# Patient Record
Sex: MALE | Race: WHITE | Employment: OTHER | URBAN - METROPOLITAN AREA
[De-identification: names, ages, dates, MRNs, and addresses within clinical notes are randomized per-mention and may not be internally consistent; named-entity substitution may affect disease eponyms.]

---

## 2017-07-29 ENCOUNTER — HOSPITAL ENCOUNTER (EMERGENCY)
Facility: HOSPITAL | Age: 42
Discharge: HOME | End: 2017-07-29
Attending: EMERGENCY MEDICINE
Payer: MEDICAID

## 2017-07-29 VITALS
BODY MASS INDEX: 28.7 KG/M2 | RESPIRATION RATE: 20 BRPM | HEART RATE: 78 BPM | TEMPERATURE: 98 F | DIASTOLIC BLOOD PRESSURE: 76 MMHG | HEIGHT: 71 IN | WEIGHT: 205 LBS | SYSTOLIC BLOOD PRESSURE: 124 MMHG | OXYGEN SATURATION: 97 %

## 2017-07-29 DIAGNOSIS — M10.9 ACUTE GOUT OF FOOT, UNSPECIFIED CAUSE, UNSPECIFIED LATERALITY: Primary | ICD-10-CM

## 2017-07-29 PROCEDURE — 99283 EMERGENCY DEPT VISIT LOW MDM: CPT

## 2017-07-29 RX ORDER — INDOMETHACIN 50 MG/1
50 CAPSULE ORAL 3 TIMES DAILY
Qty: 15 CAPSULE | Refills: 0 | Status: SHIPPED | OUTPATIENT
Start: 2017-07-29 | End: 2017-08-04

## 2017-07-29 NOTE — ED PROVIDER NOTES
Patient Seen in: [facility] AND [facility] Emergency Department    History   Patient presents with:  Swelling Edema (cardiovascular, metabolic)    Stated Complaint:     HPI    25-year-old male with history of gout presents for evaluation of bilateral foot pain. None (Room air)    Current:/76   Pulse 78   Temp 97.8 °F (36.6 °C) (Tympanic)   Resp 20   Ht 180.3 cm (5' 11\")   Wt 93 kg   SpO2 97%   BMI 28.59 kg/m²         Physical Exam   Constitutional: He is oriented to person, place, and time.  He appears well specified. Medications Prescribed:  Current Discharge Medication List    START taking these medications    !! indomethacin 50 MG Oral Cap  Take 1 capsule (50 mg total) by mouth 3 (three) times daily.  Take 3 times daily until pain is tolerable  Qty: 15 c

## 2017-08-04 ENCOUNTER — LAB ENCOUNTER (OUTPATIENT)
Dept: LAB | Age: 42
End: 2017-08-04
Attending: FAMILY MEDICINE
Payer: MEDICAID

## 2017-08-04 ENCOUNTER — OFFICE VISIT (OUTPATIENT)
Dept: FAMILY MEDICINE CLINIC | Facility: CLINIC | Age: 42
End: 2017-08-04

## 2017-08-04 VITALS
HEART RATE: 90 BPM | DIASTOLIC BLOOD PRESSURE: 78 MMHG | WEIGHT: 194 LBS | BODY MASS INDEX: 27 KG/M2 | SYSTOLIC BLOOD PRESSURE: 130 MMHG

## 2017-08-04 DIAGNOSIS — Z00.00 PHYSICAL EXAM: ICD-10-CM

## 2017-08-04 DIAGNOSIS — Z00.00 PHYSICAL EXAM: Primary | ICD-10-CM

## 2017-08-04 DIAGNOSIS — M10.00 ACUTE IDIOPATHIC GOUT, UNSPECIFIED SITE: ICD-10-CM

## 2017-08-04 LAB
ALBUMIN SERPL BCP-MCNC: 3.9 G/DL (ref 3.5–4.8)
ALBUMIN/GLOB SERPL: 1.1 {RATIO} (ref 1–2)
ALP SERPL-CCNC: 76 U/L (ref 32–100)
ALT SERPL-CCNC: 25 U/L (ref 17–63)
ANION GAP SERPL CALC-SCNC: 9 MMOL/L (ref 0–18)
AST SERPL-CCNC: 20 U/L (ref 15–41)
BASOPHILS # BLD: 0 K/UL (ref 0–0.2)
BASOPHILS NFR BLD: 1 %
BILIRUB SERPL-MCNC: 0.5 MG/DL (ref 0.3–1.2)
BUN SERPL-MCNC: 8 MG/DL (ref 8–20)
BUN/CREAT SERPL: 10.1 (ref 10–20)
CALCIUM SERPL-MCNC: 9.2 MG/DL (ref 8.5–10.5)
CHLORIDE SERPL-SCNC: 102 MMOL/L (ref 95–110)
CHOLEST SERPL-MCNC: 134 MG/DL (ref 110–200)
CO2 SERPL-SCNC: 27 MMOL/L (ref 22–32)
CREAT SERPL-MCNC: 0.79 MG/DL (ref 0.5–1.5)
EOSINOPHIL # BLD: 0.1 K/UL (ref 0–0.7)
EOSINOPHIL NFR BLD: 1 %
ERYTHROCYTE [DISTWIDTH] IN BLOOD BY AUTOMATED COUNT: 12.6 % (ref 11–15)
GLOBULIN PLAS-MCNC: 3.5 G/DL (ref 2.5–3.7)
GLUCOSE SERPL-MCNC: 96 MG/DL (ref 70–99)
HCT VFR BLD AUTO: 39.6 % (ref 41–52)
HDLC SERPL-MCNC: 28 MG/DL
HGB BLD-MCNC: 13.5 G/DL (ref 13.5–17.5)
LDLC SERPL CALC-MCNC: 71 MG/DL (ref 0–99)
LYMPHOCYTES # BLD: 1.4 K/UL (ref 1–4)
LYMPHOCYTES NFR BLD: 16 %
MCH RBC QN AUTO: 30.7 PG (ref 27–32)
MCHC RBC AUTO-ENTMCNC: 34.1 G/DL (ref 32–37)
MCV RBC AUTO: 90.1 FL (ref 80–100)
MONOCYTES # BLD: 0.7 K/UL (ref 0–1)
MONOCYTES NFR BLD: 9 %
NEUTROPHILS # BLD AUTO: 6.1 K/UL (ref 1.8–7.7)
NEUTROPHILS NFR BLD: 74 %
NONHDLC SERPL-MCNC: 106 MG/DL
OSMOLALITY UR CALC.SUM OF ELEC: 284 MOSM/KG (ref 275–295)
PLATELET # BLD AUTO: 304 K/UL (ref 140–400)
PMV BLD AUTO: 8.8 FL (ref 7.4–10.3)
POTASSIUM SERPL-SCNC: 3.8 MMOL/L (ref 3.3–5.1)
PROT SERPL-MCNC: 7.4 G/DL (ref 5.9–8.4)
RBC # BLD AUTO: 4.4 M/UL (ref 4.5–5.9)
SODIUM SERPL-SCNC: 138 MMOL/L (ref 136–144)
TRIGL SERPL-MCNC: 173 MG/DL (ref 1–149)
URATE SERPL-MCNC: 7.4 MG/DL (ref 3.3–8.7)
WBC # BLD AUTO: 8.4 K/UL (ref 4–11)

## 2017-08-04 PROCEDURE — 84550 ASSAY OF BLOOD/URIC ACID: CPT

## 2017-08-04 PROCEDURE — 99212 OFFICE O/P EST SF 10 MIN: CPT | Performed by: FAMILY MEDICINE

## 2017-08-04 PROCEDURE — 80053 COMPREHEN METABOLIC PANEL: CPT

## 2017-08-04 PROCEDURE — 36415 COLL VENOUS BLD VENIPUNCTURE: CPT

## 2017-08-04 PROCEDURE — 85025 COMPLETE CBC W/AUTO DIFF WBC: CPT

## 2017-08-04 PROCEDURE — 99203 OFFICE O/P NEW LOW 30 MIN: CPT | Performed by: FAMILY MEDICINE

## 2017-08-04 PROCEDURE — 80061 LIPID PANEL: CPT

## 2017-08-04 RX ORDER — INDOMETHACIN 50 MG/1
50 CAPSULE ORAL 3 TIMES DAILY
Qty: 30 CAPSULE | Refills: 1 | Status: SHIPPED | OUTPATIENT
Start: 2017-08-04 | End: 2017-08-04

## 2017-08-04 RX ORDER — INDOMETHACIN 50 MG/1
50 CAPSULE ORAL 3 TIMES DAILY
Qty: 50 CAPSULE | Refills: 1 | Status: SHIPPED | OUTPATIENT
Start: 2017-08-04 | End: 2019-01-31

## 2017-08-07 NOTE — PROGRESS NOTES
HPI:    Patient ID: Wanda Zavala is a 38year old male. Here for f/u ER. Was there for gout . Not sure if has gout or pseudogout. Has this several times a year. Multiple joints were involved so far. Now much better. But needs refill on indocin.  No Platelet [E]      Uric Acid, Serum    Meds This Visit:  Signed Prescriptions Disp Refills    indomethacin 50 MG Oral Cap 50 capsule 1      Sig: Take 1 capsule (50 mg total) by mouth 3 (three) times daily.  Take 3 times daily until pain is tolerable

## 2017-08-10 ENCOUNTER — APPOINTMENT (OUTPATIENT)
Dept: LAB | Age: 42
End: 2017-08-10
Attending: INTERNAL MEDICINE
Payer: MEDICAID

## 2017-08-10 ENCOUNTER — OFFICE VISIT (OUTPATIENT)
Dept: RHEUMATOLOGY | Facility: CLINIC | Age: 42
End: 2017-08-10

## 2017-08-10 VITALS
HEART RATE: 89 BPM | HEIGHT: 71 IN | BODY MASS INDEX: 27.75 KG/M2 | SYSTOLIC BLOOD PRESSURE: 123 MMHG | DIASTOLIC BLOOD PRESSURE: 74 MMHG | WEIGHT: 198.19 LBS

## 2017-08-10 DIAGNOSIS — M13.0: ICD-10-CM

## 2017-08-10 DIAGNOSIS — M1A.0710 CHRONIC GOUT OF RIGHT FOOT, UNSPECIFIED CAUSE: ICD-10-CM

## 2017-08-10 DIAGNOSIS — M13.0: Primary | ICD-10-CM

## 2017-08-10 LAB
CRP SERPL-MCNC: 1.1 MG/DL (ref 0–0.9)
ERYTHROCYTE [SEDIMENTATION RATE] IN BLOOD: 9 MM/HR (ref 0–15)
RHEUMATOID FACT SER QL: <5 IU/ML
URATE SERPL-MCNC: 9.2 MG/DL (ref 3.3–8.7)

## 2017-08-10 PROCEDURE — 86140 C-REACTIVE PROTEIN: CPT

## 2017-08-10 PROCEDURE — 86200 CCP ANTIBODY: CPT

## 2017-08-10 PROCEDURE — 86038 ANTINUCLEAR ANTIBODIES: CPT

## 2017-08-10 PROCEDURE — 36415 COLL VENOUS BLD VENIPUNCTURE: CPT

## 2017-08-10 PROCEDURE — 84550 ASSAY OF BLOOD/URIC ACID: CPT

## 2017-08-10 PROCEDURE — 99244 OFF/OP CNSLTJ NEW/EST MOD 40: CPT | Performed by: INTERNAL MEDICINE

## 2017-08-10 PROCEDURE — 85652 RBC SED RATE AUTOMATED: CPT

## 2017-08-10 PROCEDURE — 99212 OFFICE O/P EST SF 10 MIN: CPT | Performed by: INTERNAL MEDICINE

## 2017-08-10 PROCEDURE — 86431 RHEUMATOID FACTOR QUANT: CPT

## 2017-08-10 RX ORDER — ALLOPURINOL 100 MG/1
100 TABLET ORAL DAILY
Qty: 30 TABLET | Refills: 6 | Status: SHIPPED | OUTPATIENT
Start: 2017-08-10 | End: 2018-04-17

## 2017-08-10 NOTE — PATIENT INSTRUCTIONS
1. Check labs  2. Start allopurinol 100mg a day   3. Take indocine 50mg a day x 2 weeks when starting alloprouinol   4. Change diet - decrease red meat intake   5. Return to clinic in 2-3 months.    6. Call and can give you prednisone burst for any flare up

## 2017-08-10 NOTE — PROGRESS NOTES
Luisa Langford is a 38year old male who presents for Patient presents with: Foot Pain: right foot pain  . HPI:     I had the pleasure of seeing Luisa Langford on 8/10/2017 for evaluation. Referred by dr. Cyndi Pedersen.      He is a pleasant 38year old who has gout Medical History:   Diagnosis Date   • Gout    • Gout       History reviewed. No pertinent surgical history.    Family History   Problem Relation Age of Onset   • Diabetes Father    • pseudogout [OTHER] Father       Social History:  Smoking status: Never Smo Mid tarsal area slight raised area /swelling -   Left knee not tender, nos welling , left ankel and toes not tender  EXTREMITIES: no cyanosis, clubbing or edema  NEURO: intact touch, 5/5 ue and le strength    Component      Latest Ref Rng & Units 8/4/201 1 - 149 mg/dL 173 (H)  536 (H)   HDL Cholesterol      mg/dL 28  30 (L)   VLDL Cholesterol Jose Miguel      5 - 40 mg/dL      LDL Cholesterol Calc      0 - 99 mg/dL 71     LDL/HDL RATIO      ratio units   CANCELED   NON HDL CHOL      <130 mg/dL 106     TSH      0.4

## 2017-08-11 LAB — CCP IGG SERPL-ACNC: 1.3 U/ML (ref 0–6.9)

## 2017-08-15 LAB — ANA SER QL: NEGATIVE

## 2017-08-19 ENCOUNTER — TELEPHONE (OUTPATIENT)
Dept: RHEUMATOLOGY | Facility: CLINIC | Age: 42
End: 2017-08-19

## 2017-08-19 RX ORDER — PREDNISONE 10 MG/1
TABLET ORAL
Qty: 21 TABLET | Refills: 0 | Status: SHIPPED | OUTPATIENT
Start: 2017-08-19 | End: 2020-03-09 | Stop reason: ALTCHOICE

## 2017-08-19 NOTE — TELEPHONE ENCOUNTER
Patient reports pain in his toes and hands 8/10 on the pain scale. Its worse at night. Since he started Allopurinol Monday he has noticed an increase in pain. He would like something for the pain. Please advise.

## 2017-08-19 NOTE — TELEPHONE ENCOUNTER
Pt calling and stts he is in so much pain. Pts stts he is having a flare up worse then the one from two weeks ago. Pt stts is most likely from the med he was given (allopurinol 100 MG Oral Tab).  Pt stts he has a busy weekend and needs some type of pain med

## 2017-08-28 ENCOUNTER — TELEPHONE (OUTPATIENT)
Dept: RHEUMATOLOGY | Facility: CLINIC | Age: 42
End: 2017-08-28

## 2017-08-28 RX ORDER — PREDNISONE 1 MG/1
TABLET ORAL
Qty: 42 TABLET | Refills: 0 | Status: SHIPPED | OUTPATIENT
Start: 2017-08-28 | End: 2020-03-09 | Stop reason: ALTCHOICE

## 2017-08-28 NOTE — TELEPHONE ENCOUNTER
Left message and direction for prednisone burst starting at 6 tablet to 1 tablet and then to continue on 1 tablet daily. Follow up if pain continues. Call office back with questions.

## 2017-08-28 NOTE — TELEPHONE ENCOUNTER
Please see below. He reports finishing prednisone burst last Thursday or Friday. He flare up started this am in right foot and the palm of right hand. Taking allopurinol 100 mg daily as prescribed. Pt advised he can take indomethacin for his pain.  Please a

## 2017-08-28 NOTE — TELEPHONE ENCOUNTER
Pt calling and stts he is having another gout flare up. Pt stts the med below is not working. Please advise. Medication Quantity Refills Start End   allopurinol 100 MG Oral Tab 30 tablet 6 8/10/2017    Sig :  Take 1 tablet (100 mg total) by mouth daily.

## 2017-08-28 NOTE — TELEPHONE ENCOUNTER
Sent in prednisone burst - 5mg tablets - he can take burst and then stay on 5mg aday for 1 month - have him return to clinic if he still has pain

## 2018-04-17 ENCOUNTER — TELEPHONE (OUTPATIENT)
Dept: RHEUMATOLOGY | Facility: CLINIC | Age: 43
End: 2018-04-17

## 2018-04-17 NOTE — TELEPHONE ENCOUNTER
Pharmacy called and states they just received a fax for     allopurinol 100 MG Oral Tab  Pharmacist states they just received the fax back that they sent to Dr   They did not receive a rx

## 2018-04-18 RX ORDER — ALLOPURINOL 100 MG/1
100 TABLET ORAL DAILY
Qty: 30 TABLET | Refills: 3 | Status: SHIPPED | OUTPATIENT
Start: 2018-04-18 | End: 2018-08-17

## 2018-08-17 RX ORDER — ALLOPURINOL 100 MG/1
100 TABLET ORAL DAILY
Qty: 30 TABLET | Refills: 1 | Status: SHIPPED | OUTPATIENT
Start: 2018-08-17 | End: 2020-03-09

## 2018-08-17 NOTE — TELEPHONE ENCOUNTER
LOV: 8/10/17  No future appointments.   Labs:   Component      Latest Ref Rng & Units 8/10/2017   C-REACTIVE PROTEIN      0.0 - 0.9 mg/dL 1.1 (H)   SED RATE      0 - 15 mm/Hr 9   TAMICA SCREEN      Negative Negative   C-Citrullinated Peptide IgG AB      0.0 -

## 2018-12-17 ENCOUNTER — TELEPHONE (OUTPATIENT)
Dept: RHEUMATOLOGY | Facility: CLINIC | Age: 43
End: 2018-12-17

## 2018-12-17 RX ORDER — PREDNISONE 10 MG/1
TABLET ORAL
Qty: 21 TABLET | Refills: 0 | Status: SHIPPED | OUTPATIENT
Start: 2018-12-17 | End: 2020-03-09 | Stop reason: ALTCHOICE

## 2018-12-17 NOTE — TELEPHONE ENCOUNTER
Called and spoke with pt, c/o gout flare up in big toe on both feet, pain 6/10 in toes which extends up to calf ms, and tender to touch pt reports. Pt is asking for refill request or same day appt. Will forward to provider for further pt instructions.

## 2018-12-17 NOTE — TELEPHONE ENCOUNTER
Patient is having a bad Gout Flare and requesting either refill for     allopurinol 100 MG Oral Tab Take 1 tablet (100 mg total) by mouth daily. Disp: 30 tablet Rfl: 1     Or something to calm the flare or an appointment ASAP.      First opening is 01/08/20

## 2018-12-18 NOTE — TELEPHONE ENCOUNTER
Left message for the pt, but Can you let pt.  Know that I sent in a prednisone burst and that he needs to make a follow up asap - please schedule him for first available - and put him on the wait list -

## 2018-12-18 NOTE — TELEPHONE ENCOUNTER
Called and spoke with pt, informed him of prescription at pharmacy and pt states he is headed there now to . Advised to schedule f/u, pt is on vacation in beginning to mid January. Scheduled 1/31/19 with Dr. Julia Caballero.  Pt in agreement and verbal

## 2019-01-31 ENCOUNTER — TELEPHONE (OUTPATIENT)
Dept: PULMONOLOGY | Facility: CLINIC | Age: 44
End: 2019-01-31

## 2019-01-31 ENCOUNTER — APPOINTMENT (OUTPATIENT)
Dept: LAB | Age: 44
End: 2019-01-31
Attending: INTERNAL MEDICINE
Payer: MEDICAID

## 2019-01-31 ENCOUNTER — OFFICE VISIT (OUTPATIENT)
Dept: RHEUMATOLOGY | Facility: CLINIC | Age: 44
End: 2019-01-31
Payer: MEDICAID

## 2019-01-31 VITALS
HEIGHT: 71 IN | BODY MASS INDEX: 29.4 KG/M2 | SYSTOLIC BLOOD PRESSURE: 130 MMHG | WEIGHT: 210 LBS | DIASTOLIC BLOOD PRESSURE: 79 MMHG | HEART RATE: 93 BPM

## 2019-01-31 DIAGNOSIS — M1A.0710 CHRONIC GOUT OF RIGHT FOOT, UNSPECIFIED CAUSE: Primary | ICD-10-CM

## 2019-01-31 DIAGNOSIS — M1A.0710 CHRONIC GOUT OF RIGHT FOOT, UNSPECIFIED CAUSE: ICD-10-CM

## 2019-01-31 LAB
ALBUMIN SERPL BCP-MCNC: 4 G/DL (ref 3.5–4.8)
ALBUMIN/GLOB SERPL: 1.3 {RATIO} (ref 1–2)
ALP SERPL-CCNC: 59 U/L (ref 32–100)
ALT SERPL-CCNC: 28 U/L (ref 17–63)
ANION GAP SERPL CALC-SCNC: 11 MMOL/L (ref 0–18)
AST SERPL-CCNC: 23 U/L (ref 15–41)
BILIRUB SERPL-MCNC: 0.5 MG/DL (ref 0.3–1.2)
BUN SERPL-MCNC: 9 MG/DL (ref 8–20)
BUN/CREAT SERPL: 8.3 (ref 10–20)
CALCIUM SERPL-MCNC: 9.1 MG/DL (ref 8.5–10.5)
CHLORIDE SERPL-SCNC: 103 MMOL/L (ref 95–110)
CO2 SERPL-SCNC: 24 MMOL/L (ref 22–32)
CREAT SERPL-MCNC: 1.08 MG/DL (ref 0.5–1.5)
GLOBULIN PLAS-MCNC: 3.2 G/DL (ref 2.5–3.7)
GLUCOSE SERPL-MCNC: 77 MG/DL (ref 70–99)
OSMOLALITY UR CALC.SUM OF ELEC: 283 MOSM/KG (ref 275–295)
PATIENT FASTING: NO
POTASSIUM SERPL-SCNC: 3.9 MMOL/L (ref 3.3–5.1)
PROT SERPL-MCNC: 7.2 G/DL (ref 5.9–8.4)
SODIUM SERPL-SCNC: 138 MMOL/L (ref 136–144)
URATE SERPL-MCNC: 9.1 MG/DL (ref 3.3–8.7)

## 2019-01-31 PROCEDURE — 99212 OFFICE O/P EST SF 10 MIN: CPT | Performed by: INTERNAL MEDICINE

## 2019-01-31 PROCEDURE — 36415 COLL VENOUS BLD VENIPUNCTURE: CPT

## 2019-01-31 PROCEDURE — 99214 OFFICE O/P EST MOD 30 MIN: CPT | Performed by: INTERNAL MEDICINE

## 2019-01-31 PROCEDURE — 84550 ASSAY OF BLOOD/URIC ACID: CPT

## 2019-01-31 PROCEDURE — 80053 COMPREHEN METABOLIC PANEL: CPT

## 2019-01-31 RX ORDER — INDOMETHACIN 50 MG/1
50 CAPSULE ORAL 3 TIMES DAILY
Qty: 90 CAPSULE | Refills: 1 | Status: SHIPPED | OUTPATIENT
Start: 2019-01-31 | End: 2019-12-26

## 2019-01-31 NOTE — PATIENT INSTRUCTIONS
1. Cont. indocine 50mg three times day as needed until flare is better. 2. Check labs today   3. Return to clinic in 6-12 months.

## 2019-01-31 NOTE — TELEPHONE ENCOUNTER
Fax received at [facility]. PA needed. Plan does not cover this medication. Please call plan at [phone] to initiate PA or call/fax pharmacy to change medication. Patient ID is# [id number].       Current Outpatient Medications:  indomethacin 50 MG Oral C

## 2019-01-31 NOTE — PROGRESS NOTES
Chelsea Pearce is a 37year old male who presents for Patient presents with:  Hand Pain: leg cramps  . HPI:     I had the pleasure of seeing Chelsea Pearce on 8/10/2017 for evaluation. Referred by dr. Severo Fruit.      He is a pleasant 38year old who has gout flare knees.   His diet has not changed. He doesn't drink now for 3 weeks. He now just drinks to twice a week on the weekends. Doesn't drink beer. Burst pack makes him crazy.      Wt Readings from Last 2 Encounters:  01/31/19 : 210 lb (95.3 kg)  08/10/17 pain  Eyes: No visual changes,   CVS: No chest pain, no heart disease  RS: No SOB, no Cough, No Pleurtic pain,   GI: No nausea, no vomiiting, no abominal pain, no hx of ulcer, no gastritis, no heartburn, no dyshpagia, no BRBPR or melena  : no dysuria, at BUN/CREA RATIO      10.0 - 20.0 10.1     CALCULATED OSMOLALITY      275 - 295 mOsm/kg 284     GFR, Non-      >=60 >60     GFR, -American      >=60 >60     WBC      4.0 - 11.0 K/UL 8.4     RBC      4.50 - 5.90 M/UL 4.40 (L)     He likely representing     triceps insertional tendinopathy. ASSESSMENT AND PLAN:   Estephania Sandhu is a 37year old male who presents for Patient presents with:  Hand Pain: leg cramps      1. Gout - clnically pt.  Seems to have longstanding hx of gout -   Like

## 2019-02-01 ENCOUNTER — TELEPHONE (OUTPATIENT)
Dept: RHEUMATOLOGY | Facility: CLINIC | Age: 44
End: 2019-02-01

## 2019-02-04 NOTE — TELEPHONE ENCOUNTER
PA approved from 1/31/2019 through 1/31/2020 Case #[id number]. Pharmacy contracted and aware. Pharmacy will contact pt when script is ready.

## 2019-12-26 RX ORDER — INDOMETHACIN 50 MG/1
50 CAPSULE ORAL 3 TIMES DAILY
Qty: 90 CAPSULE | Refills: 1 | Status: SHIPPED | OUTPATIENT
Start: 2019-12-26 | End: 2021-02-03

## 2019-12-26 NOTE — TELEPHONE ENCOUNTER
Last filled: 1/31/2019 #90 with 1 refill    LOV: 1/31/2019 - pt was scheduled for f/u appt with Dr. Osman Roy in March   Future Appointments   Date Time Provider Blake Latham   3/9/2020  3:30 PM Remington Osorio MD [facility]   Labs:     Component

## 2019-12-26 NOTE — TELEPHONE ENCOUNTER
Fax received in [facility] requesting refill. Current Outpatient Medications   Medication Sig Dispense Refill   • indomethacin 50 MG Oral Cap Take 1 capsule (50 mg total) by mouth 3 (three) times daily.  Take 3 times daily until pain is tolerable 90 capsule 1

## 2020-03-09 ENCOUNTER — OFFICE VISIT (OUTPATIENT)
Dept: RHEUMATOLOGY | Facility: CLINIC | Age: 45
End: 2020-03-09
Payer: MEDICAID

## 2020-03-09 ENCOUNTER — APPOINTMENT (OUTPATIENT)
Dept: LAB | Facility: HOSPITAL | Age: 45
End: 2020-03-09
Attending: INTERNAL MEDICINE
Payer: MEDICAID

## 2020-03-09 VITALS
SYSTOLIC BLOOD PRESSURE: 146 MMHG | RESPIRATION RATE: 16 BRPM | HEIGHT: 71 IN | WEIGHT: 213 LBS | HEART RATE: 76 BPM | DIASTOLIC BLOOD PRESSURE: 79 MMHG | BODY MASS INDEX: 29.82 KG/M2

## 2020-03-09 DIAGNOSIS — M1A.09X0 CHRONIC GOUT OF MULTIPLE SITES, UNSPECIFIED CAUSE: Primary | ICD-10-CM

## 2020-03-09 DIAGNOSIS — M1A.09X0 CHRONIC GOUT OF MULTIPLE SITES, UNSPECIFIED CAUSE: ICD-10-CM

## 2020-03-09 LAB
ALBUMIN SERPL-MCNC: 4.2 G/DL (ref 3.4–5)
ALBUMIN/GLOB SERPL: 1.2 {RATIO} (ref 1–2)
ALP LIVER SERPL-CCNC: 65 U/L (ref 45–117)
ALT SERPL-CCNC: 39 U/L (ref 16–61)
ANION GAP SERPL CALC-SCNC: 7 MMOL/L (ref 0–18)
AST SERPL-CCNC: 21 U/L (ref 15–37)
BILIRUB SERPL-MCNC: 0.3 MG/DL (ref 0.1–2)
BUN BLD-MCNC: 11 MG/DL (ref 7–18)
BUN/CREAT SERPL: 12.5 (ref 10–20)
CALCIUM BLD-MCNC: 9.2 MG/DL (ref 8.5–10.1)
CHLORIDE SERPL-SCNC: 103 MMOL/L (ref 98–112)
CO2 SERPL-SCNC: 28 MMOL/L (ref 21–32)
CREAT BLD-MCNC: 0.88 MG/DL (ref 0.7–1.3)
GLOBULIN PLAS-MCNC: 3.4 G/DL (ref 2.8–4.4)
GLUCOSE BLD-MCNC: 89 MG/DL (ref 70–99)
M PROTEIN MFR SERPL ELPH: 7.6 G/DL (ref 6.4–8.2)
OSMOLALITY SERPL CALC.SUM OF ELEC: 285 MOSM/KG (ref 275–295)
PATIENT FASTING Y/N/NP: NO
POTASSIUM SERPL-SCNC: 3.7 MMOL/L (ref 3.5–5.1)
SODIUM SERPL-SCNC: 138 MMOL/L (ref 136–145)
URATE SERPL-MCNC: 9.4 MG/DL (ref 3.5–7.2)

## 2020-03-09 PROCEDURE — 84550 ASSAY OF BLOOD/URIC ACID: CPT

## 2020-03-09 PROCEDURE — 80053 COMPREHEN METABOLIC PANEL: CPT

## 2020-03-09 PROCEDURE — 99214 OFFICE O/P EST MOD 30 MIN: CPT | Performed by: INTERNAL MEDICINE

## 2020-03-09 PROCEDURE — 36415 COLL VENOUS BLD VENIPUNCTURE: CPT

## 2020-03-09 RX ORDER — ALLOPURINOL 100 MG/1
100 TABLET ORAL DAILY
Qty: 90 TABLET | Refills: 1 | Status: SHIPPED | OUTPATIENT
Start: 2020-03-09 | End: 2020-09-08

## 2020-03-09 NOTE — PATIENT INSTRUCTIONS
1. Cont. indocine 50mg a day tid prn as needed. 2. Check labs today   3. Overlap allopurinol 100mg a day and take indocin 50mg a day x 2 weeks, then ok to stop indocin -   4. Return to clinic in 6-12 months.

## 2020-03-09 NOTE — PROGRESS NOTES
Kalen Woody is a 53year old male who presents for Patient presents with:  Gout  Medication Follow-Up  . HPI:     I had the pleasure of seeing Kalen Woody on 8/10/2017 for evaluation. Referred by dr. Naomie Kim.      He is a pleasant 38year old who has gout and knees. His diet has not changed. He doesn't drink now for 3 weeks. He now just drinks to twice a week on the weekends. Doesn't drink beer. Burst pack makes him crazy. 3/9/2020  His left foot had a flare in 12/2019.  He had a bad flare - an neck pain, no jaw pain, no temple pain  Eyes: No visual changes,   CVS: No chest pain, no heart disease  RS: No SOB, no Cough, No Pleurtic pain,   GI: No nausea, no vomiiting, no abominal pain, no hx of ulcer, no gastritis, no heartburn, no dyshpagia, no B TOTAL PROTEIN      5.9 - 8.4 g/dL 7.4     Albumin      3.5 - 4.8 g/dL 3.9     GLOBULIN, TOTAL      2.5 - 3.7 g/dL 3.5     A/G RATIO      1.0 - 2.0 1.1     ANION GAP      0 - 18 mmol/L 9     BUN/CREA RATIO      10.0 - 20.0 10.1     CALCULATED OSMOLALITY Latest Ref Rng & Units 1/31/2019   Glucose      70 - 99 mg/dL 77   Sodium      136 - 144 mmol/L 138   Potassium      3.3 - 5.1 mmol/L 3.9   Chloride      95 - 110 mmol/L 103   Carbon Dioxide, Total      22 - 32 mmol/L 24   BUN      8 - 20 mg/dL 9   CREA colchicine 0.6mg a day -   - in the past responded to steroid bursts -     Summary:  1. Cont. indocine 50mg a day tid prn as needed. 2. Check labs today   3.  Overlap allopurinol 100mg a day and take indocin 50mg a day x 2 -4 weeks, then ok to stop indoci

## 2020-04-21 ENCOUNTER — PATIENT MESSAGE (OUTPATIENT)
Dept: RHEUMATOLOGY | Facility: CLINIC | Age: 45
End: 2020-04-21

## 2020-04-21 RX ORDER — PREDNISONE 1 MG/1
TABLET ORAL
Qty: 42 TABLET | Refills: 0 | Status: SHIPPED | OUTPATIENT
Start: 2020-04-21

## 2020-04-21 NOTE — TELEPHONE ENCOUNTER
From: Tomi Green  To: Erika Fletcher MD  Sent: 4/21/2020 12:11 PM CDT  Subject: Non-Urgent Medical Question    Had a bad gout flare as I am about 5 weeks in to taking Allopurinol.  Does this sound normal?

## 2020-04-21 NOTE — TELEPHONE ENCOUNTER
Myrtle Urena. Hopefully you got my telephone message. You are having an acute gout attack at the base of your great toe. I sent a 12-day prednisone prescription to [address] [place] [address], at [address] [place] [address].   This should take care of your acu

## 2020-09-08 RX ORDER — ALLOPURINOL 100 MG/1
100 TABLET ORAL DAILY
Qty: 90 TABLET | Refills: 1 | Status: SHIPPED | OUTPATIENT
Start: 2020-09-08

## 2020-09-08 NOTE — TELEPHONE ENCOUNTER
Current Outpatient Medications   Medication Sig Dispense Refill   • allopurinol 100 MG Oral Tab Take 1 tablet (100 mg total) by mouth daily.  90 tablet 1

## 2020-09-08 NOTE — TELEPHONE ENCOUNTER
LOV: 3/9/2020  No future appointments.   Labs:   Component      Latest Ref Rng & Units 3/9/2020   Glucose      70 - 99 mg/dL 89   Sodium      136 - 145 mmol/L 138   Potassium      3.5 - 5.1 mmol/L 3.7   Chloride      98 - 112 mmol/L 103   Carbon Dioxide, To

## 2021-02-03 DIAGNOSIS — M1A.9XX0 CHRONIC GOUT WITHOUT TOPHUS, UNSPECIFIED CAUSE, UNSPECIFIED SITE: Primary | ICD-10-CM

## 2021-02-03 RX ORDER — INDOMETHACIN 50 MG/1
50 CAPSULE ORAL 3 TIMES DAILY PRN
Qty: 90 CAPSULE | Refills: 0 | Status: SHIPPED | OUTPATIENT
Start: 2021-02-03

## 2021-02-03 NOTE — TELEPHONE ENCOUNTER
Last filled: 12/26/19 #90 cap with 1 refill  LOV:  3/9/20  No future appointments. Labs: 3/9/20  Summary:  1. Cont. indocine 50mg a day tid prn as needed. 2. Check labs today   3.  Overlap allopurinol 100mg a day and take indocin 50mg a day x 2 -4 weeks,

## 2021-03-08 ENCOUNTER — TELEPHONE (OUTPATIENT)
Dept: RHEUMATOLOGY | Facility: CLINIC | Age: 46
End: 2021-03-08

## 2021-03-08 DIAGNOSIS — M1A.9XX0 CHRONIC GOUT WITHOUT TOPHUS, UNSPECIFIED CAUSE, UNSPECIFIED SITE: Primary | ICD-10-CM

## 2021-03-08 RX ORDER — ALLOPURINOL 100 MG/1
100 TABLET ORAL DAILY
Qty: 90 TABLET | Refills: 1 | OUTPATIENT
Start: 2021-03-08

## 2021-03-08 NOTE — TELEPHONE ENCOUNTER
LOV: 3/9/20  Last Refilled:#90, 1rf  9/8/20  Labs:URIC ACID  9.4  3/9/20    No future appointments. Please advise.

## 2021-03-08 NOTE — TELEPHONE ENCOUNTER
•  allopurinol 100 MG Oral Tab, Take 1 tablet (100 mg total) by mouth daily. , Disp: 90 tablet, Rfl: 1

## 2021-03-09 NOTE — TELEPHONE ENCOUNTER
LOV: 8/10/17  No future appointments.   Labs:   Component      Latest Ref Rng & Units 8/10/2017   C-REACTIVE PROTEIN      0.0 - 0.9 mg/dL 1.1 (H)   SED RATE      0 - 15 mm/Hr 9   TAMICA SCREEN      Negative Negative   C-Citrullinated Peptide IgG AB      0.0 - well

## 2021-07-27 ENCOUNTER — HOSPITAL ENCOUNTER (OUTPATIENT)
Age: 46
Discharge: HOME | End: 2021-07-27
Attending: EMERGENCY MEDICINE
Payer: MEDICAID

## 2021-07-27 VITALS
OXYGEN SATURATION: 100 % | TEMPERATURE: 98 F | HEART RATE: 88 BPM | DIASTOLIC BLOOD PRESSURE: 88 MMHG | RESPIRATION RATE: 16 BRPM | SYSTOLIC BLOOD PRESSURE: 128 MMHG

## 2021-07-27 DIAGNOSIS — H60.502 ACUTE OTITIS EXTERNA OF LEFT EAR, UNSPECIFIED TYPE: Primary | ICD-10-CM

## 2021-07-27 PROCEDURE — 99213 OFFICE O/P EST LOW 20 MIN: CPT

## 2021-07-27 PROCEDURE — 99203 OFFICE O/P NEW LOW 30 MIN: CPT

## 2021-07-27 RX ORDER — AMOXICILLIN 875 MG/1
875 TABLET, COATED ORAL 2 TIMES DAILY
Qty: 14 TABLET | Refills: 0 | Status: SHIPPED | OUTPATIENT
Start: 2021-07-27 | End: 2021-08-03

## 2021-07-27 RX ORDER — NEOMYCIN SULFATE, POLYMYXIN B SULFATE AND HYDROCORTISONE 10; 3.5; 1 MG/ML; MG/ML; [USP'U]/ML
3 SUSPENSION/ DROPS AURICULAR (OTIC) 4 TIMES DAILY
Qty: 10 ML | Refills: 0 | Status: SHIPPED | OUTPATIENT
Start: 2021-07-27

## 2021-07-27 NOTE — ED PROVIDER NOTES
Patient Seen in: [facility]      History   Patient presents with:  Ear Pain    Stated Complaint: ear pain    HPI/Subjective:   HPI    The patient is a 70-year-old male with a past history of gout who presents now with left ear pain.   The deborah out of 5 and symmetric in the upper and lower extremities bilaterally  Extremities: No focal swelling or tenderness  Skin: No pallor, no redness or warmth to the touch      ED Course   Labs Reviewed - No data to display       Pulse ox is 100% on room air,

## 2022-06-17 DIAGNOSIS — M1A.9XX0 CHRONIC GOUT WITHOUT TOPHUS, UNSPECIFIED CAUSE, UNSPECIFIED SITE: Primary | ICD-10-CM

## 2022-06-17 RX ORDER — INDOMETHACIN 50 MG/1
CAPSULE ORAL
Qty: 90 CAPSULE | Refills: 0 | OUTPATIENT
Start: 2022-06-17

## 2023-03-25 ENCOUNTER — HOSPITAL ENCOUNTER (OUTPATIENT)
Age: 48
Discharge: HOME | End: 2023-03-25
Payer: MEDICAID

## 2023-03-25 VITALS
TEMPERATURE: 98 F | DIASTOLIC BLOOD PRESSURE: 90 MMHG | HEART RATE: 80 BPM | OXYGEN SATURATION: 95 % | RESPIRATION RATE: 16 BRPM | SYSTOLIC BLOOD PRESSURE: 127 MMHG

## 2023-03-25 DIAGNOSIS — J01.90 ACUTE NON-RECURRENT SINUSITIS, UNSPECIFIED LOCATION: Primary | ICD-10-CM

## 2023-03-25 PROCEDURE — 99213 OFFICE O/P EST LOW 20 MIN: CPT

## 2023-03-25 RX ORDER — AMOXICILLIN AND CLAVULANATE POTASSIUM 875; 125 MG/1; MG/1
1 TABLET, FILM COATED ORAL 2 TIMES DAILY
Qty: 20 TABLET | Refills: 0 | Status: SHIPPED | OUTPATIENT
Start: 2023-03-25 | End: 2023-04-04

## 2023-03-25 NOTE — DISCHARGE INSTRUCTIONS
Please take the antibiotics as prescribed for sinusitis. Please also use Flonase and Mucinex for nasal congestion. May also use over the counter decongestants. You can also try using a [place] pot/saline rinses for congestion. Use Tylenol or Motrin for pain or fever. If you develop any shortness of breath, chest pain, severe headache, fever that does not resolve with medications or any other worsening or concerning symptoms you should go to the emergency department. Otherwise follow-up with your primary care doctor.

## 2023-06-13 ENCOUNTER — HOSPITAL ENCOUNTER (OUTPATIENT)
Age: 48
Discharge: HOME | End: 2023-06-13
Payer: MEDICAID

## 2023-06-13 VITALS
TEMPERATURE: 98 F | DIASTOLIC BLOOD PRESSURE: 93 MMHG | OXYGEN SATURATION: 97 % | HEART RATE: 79 BPM | RESPIRATION RATE: 14 BRPM | SYSTOLIC BLOOD PRESSURE: 148 MMHG

## 2023-06-13 DIAGNOSIS — M10.9 ACUTE GOUT OF RIGHT WRIST, UNSPECIFIED CAUSE: Primary | ICD-10-CM

## 2023-06-13 PROCEDURE — 99214 OFFICE O/P EST MOD 30 MIN: CPT

## 2023-06-13 PROCEDURE — 99213 OFFICE O/P EST LOW 20 MIN: CPT

## 2023-06-13 RX ORDER — METHYLPREDNISOLONE 4 MG/1
TABLET ORAL
Qty: 1 EACH | Refills: 0 | Status: SHIPPED | OUTPATIENT
Start: 2023-06-13

## 2023-06-13 RX ORDER — TRAMADOL HYDROCHLORIDE 50 MG/1
TABLET ORAL EVERY 6 HOURS PRN
Qty: 10 TABLET | Refills: 0 | Status: SHIPPED | OUTPATIENT
Start: 2023-06-13 | End: 2023-06-18

## 2023-06-13 RX ORDER — IBUPROFEN 600 MG/1
600 TABLET ORAL EVERY 8 HOURS PRN
Qty: 30 TABLET | Refills: 0 | Status: SHIPPED | OUTPATIENT
Start: 2023-06-13 | End: 2023-06-20

## 2023-06-13 NOTE — DISCHARGE INSTRUCTIONS
Please take medication as prescribed. Close follow-up with your primary care provider is recommended.

## 2023-06-13 NOTE — ED INITIAL ASSESSMENT (HPI)
Presents to IC for Rt hand pain . states woke up in AM w/ severe pain and swelling to Rt hand hx of gout

## 2023-06-22 ENCOUNTER — OFFICE VISIT (OUTPATIENT)
Dept: FAMILY MEDICINE CLINIC | Facility: CLINIC | Age: 48
End: 2023-06-22

## 2023-06-22 VITALS
SYSTOLIC BLOOD PRESSURE: 130 MMHG | TEMPERATURE: 98 F | RESPIRATION RATE: 16 BRPM | DIASTOLIC BLOOD PRESSURE: 75 MMHG | BODY MASS INDEX: 28.77 KG/M2 | HEART RATE: 72 BPM | WEIGHT: 201 LBS | HEIGHT: 70 IN

## 2023-06-22 DIAGNOSIS — M10.9 ACUTE GOUT OF RIGHT WRIST, UNSPECIFIED CAUSE: Primary | ICD-10-CM

## 2023-06-22 RX ORDER — INDOMETHACIN 50 MG/1
50 CAPSULE ORAL 3 TIMES DAILY PRN
Qty: 30 CAPSULE | Refills: 0 | Status: SHIPPED | OUTPATIENT
Start: 2023-06-22

## 2023-07-07 NOTE — TELEPHONE ENCOUNTER
Requested Prescriptions     Pending Prescriptions Disp Refills    allopurinol 100 MG Oral Tab 90 tablet 1     Sig: Take 1 tablet (100 mg total) by mouth daily. MESSAGE sent to patient informing he now requires a consult appointment. Patient Comment: I cant see gout doctor for 2 months I have appt 9/20     LOV: 3/09/20  Future Appointments   Date Time Provider Blake Noa   9/25/2023  3:50 PM Tita Parmar MD 2014 [facility]     LabS:  Component  Ref Range & Units 3/9/20  4:57 PM   Glucose  70 - 99 mg/dL 89   Sodium  136 - 145 mmol/L 138   Potassium  3.5 - 5.1 mmol/L 3.7   Chloride  98 - 112 mmol/L 103   CO2  21.0 - 32.0 mmol/L 28.0   Anion Gap  0 - 18 mmol/L 7   BUN  7 - 18 mg/dL 11   Creatinine  0.70 - 1.30 mg/dL 0.88   BUN/CREA Ratio  10.0 - 20.0 12.5   Calcium, Total  8.5 - 10.1 mg/dL 9.2   Calculated Osmolality  275 - 295 mOsm/kg 285   GFR, Non-  >=60 103   GFR, -American  >=60 119   ALT  16 - 61 U/L 39   AST  15 - 37 U/L 21   Alkaline Phosphatase  45 - 117 U/L 65   Bilirubin, Total  0.1 - 2.0 mg/dL 0.3   Total Protein  6.4 - 8.2 g/dL 7.6   Albumin  3.4 - 5.0 g/dL 4.2   Globulin  2.8 - 4.4 g/dL 3.4   A/G Ratio  1.0 - 2.0 1.2   FASTING No   Resulting Agency [facility] ([facility])     Component      Latest Ref Rng 3/9/2020   URIC ACID      3.5 - 7.2 mg/dL 9.4 (H)       Legend:  (H) High    Summary:  1. Cont. indocine 50mg a day tid prn as needed. 2. Check labs today   3. Overlap allopurinol 100mg a day and take indocin 50mg a day x 2 -4 weeks, then ok to stop indocin -   4. Return to clinic in 6 months.          Raghu Rolon MD  3/9/2020   3:59 PM  - [place]ed [place]  through [facility]

## 2023-07-08 RX ORDER — ALLOPURINOL 100 MG/1
100 TABLET ORAL DAILY
Qty: 90 TABLET | Refills: 1 | Status: SHIPPED | OUTPATIENT
Start: 2023-07-08

## 2023-09-25 ENCOUNTER — OFFICE VISIT (OUTPATIENT)
Dept: RHEUMATOLOGY | Facility: CLINIC | Age: 48
End: 2023-09-25

## 2023-09-25 ENCOUNTER — LAB ENCOUNTER (OUTPATIENT)
Dept: LAB | Facility: HOSPITAL | Age: 48
End: 2023-09-25
Attending: INTERNAL MEDICINE
Payer: MEDICAID

## 2023-09-25 VITALS
SYSTOLIC BLOOD PRESSURE: 103 MMHG | DIASTOLIC BLOOD PRESSURE: 67 MMHG | BODY MASS INDEX: 28.42 KG/M2 | WEIGHT: 198.5 LBS | HEART RATE: 82 BPM | RESPIRATION RATE: 16 BRPM | HEIGHT: 70 IN

## 2023-09-25 DIAGNOSIS — Z13.220 SCREENING CHOLESTEROL LEVEL: ICD-10-CM

## 2023-09-25 DIAGNOSIS — M1A.9XX0 CHRONIC GOUT WITHOUT TOPHUS, UNSPECIFIED CAUSE, UNSPECIFIED SITE: ICD-10-CM

## 2023-09-25 DIAGNOSIS — M1A.9XX0 CHRONIC GOUT WITHOUT TOPHUS, UNSPECIFIED CAUSE, UNSPECIFIED SITE: Primary | ICD-10-CM

## 2023-09-25 LAB
ALBUMIN SERPL-MCNC: 3.8 G/DL (ref 3.4–5)
ALBUMIN/GLOB SERPL: 1 {RATIO} (ref 1–2)
ALP LIVER SERPL-CCNC: 57 U/L
ALT SERPL-CCNC: 26 U/L
ANION GAP SERPL CALC-SCNC: 7 MMOL/L (ref 0–18)
AST SERPL-CCNC: 19 U/L (ref 15–37)
BILIRUB SERPL-MCNC: 0.3 MG/DL (ref 0.1–2)
BUN BLD-MCNC: 13 MG/DL (ref 7–18)
BUN/CREAT SERPL: 14.1 (ref 10–20)
CALCIUM BLD-MCNC: 9 MG/DL (ref 8.5–10.1)
CHLORIDE SERPL-SCNC: 106 MMOL/L (ref 98–112)
CHOLEST SERPL-MCNC: 178 MG/DL (ref ?–200)
CO2 SERPL-SCNC: 28 MMOL/L (ref 21–32)
CREAT BLD-MCNC: 0.92 MG/DL
CRP SERPL-MCNC: <0.29 MG/DL (ref ?–0.3)
EGFRCR SERPLBLD CKD-EPI 2021: 101 ML/MIN/1.73M2 (ref 60–?)
ERYTHROCYTE [SEDIMENTATION RATE] IN BLOOD: 4 MM/HR
FASTING PATIENT LIPID ANSWER: NO
FASTING STATUS PATIENT QL REPORTED: NO
GLOBULIN PLAS-MCNC: 3.9 G/DL (ref 2.8–4.4)
GLUCOSE BLD-MCNC: 111 MG/DL (ref 70–99)
HDLC SERPL-MCNC: 38 MG/DL (ref 40–59)
LDLC SERPL CALC-MCNC: 65 MG/DL (ref ?–100)
NONHDLC SERPL-MCNC: 140 MG/DL (ref ?–130)
OSMOLALITY SERPL CALC.SUM OF ELEC: 293 MOSM/KG (ref 275–295)
POTASSIUM SERPL-SCNC: 3.8 MMOL/L (ref 3.5–5.1)
PROT SERPL-MCNC: 7.7 G/DL (ref 6.4–8.2)
SODIUM SERPL-SCNC: 141 MMOL/L (ref 136–145)
TRIGL SERPL-MCNC: 489 MG/DL (ref 30–149)
URATE SERPL-MCNC: 5.7 MG/DL
VLDLC SERPL CALC-MCNC: 74 MG/DL (ref 0–30)

## 2023-09-25 PROCEDURE — 84550 ASSAY OF BLOOD/URIC ACID: CPT

## 2023-09-25 PROCEDURE — 86140 C-REACTIVE PROTEIN: CPT

## 2023-09-25 PROCEDURE — 3008F BODY MASS INDEX DOCD: CPT | Performed by: INTERNAL MEDICINE

## 2023-09-25 PROCEDURE — 80061 LIPID PANEL: CPT

## 2023-09-25 PROCEDURE — 3074F SYST BP LT 130 MM HG: CPT | Performed by: INTERNAL MEDICINE

## 2023-09-25 PROCEDURE — 99244 OFF/OP CNSLTJ NEW/EST MOD 40: CPT | Performed by: INTERNAL MEDICINE

## 2023-09-25 PROCEDURE — 85652 RBC SED RATE AUTOMATED: CPT

## 2023-09-25 PROCEDURE — 80053 COMPREHEN METABOLIC PANEL: CPT

## 2023-09-25 PROCEDURE — 36415 COLL VENOUS BLD VENIPUNCTURE: CPT

## 2023-09-25 PROCEDURE — 3078F DIAST BP <80 MM HG: CPT | Performed by: INTERNAL MEDICINE

## 2023-09-25 RX ORDER — INDOMETHACIN 50 MG/1
50 CAPSULE ORAL 3 TIMES DAILY PRN
Qty: 30 CAPSULE | Refills: 1 | Status: SHIPPED | OUTPATIENT
Start: 2023-09-25

## 2023-09-25 RX ORDER — ALLOPURINOL 300 MG/1
300 TABLET ORAL DAILY
Qty: 90 TABLET | Refills: 3 | Status: SHIPPED | OUTPATIENT
Start: 2023-09-25

## 2023-09-25 NOTE — PATIENT INSTRUCTIONS
1. Cont. indocine 50mg a day tid prn as needed. 2. Increase allopurinol to 300mg a day and take indocin 50mg a day x 2 -4 weeks, then stop   3. Check labs    4. Return to clinic in 6 months.

## 2023-12-11 RX ORDER — ALLOPURINOL 100 MG/1
100 TABLET ORAL DAILY
Qty: 90 TABLET | Refills: 1 | OUTPATIENT
Start: 2023-12-11

## 2024-04-10 ENCOUNTER — HOSPITAL ENCOUNTER (OUTPATIENT)
Age: 49
Discharge: HOME | End: 2024-04-10
Payer: MEDICAID

## 2024-04-10 VITALS
DIASTOLIC BLOOD PRESSURE: 90 MMHG | OXYGEN SATURATION: 97 % | TEMPERATURE: 97 F | SYSTOLIC BLOOD PRESSURE: 139 MMHG | HEART RATE: 74 BPM | RESPIRATION RATE: 18 BRPM

## 2024-04-10 DIAGNOSIS — J02.9 VIRAL PHARYNGITIS: Primary | ICD-10-CM

## 2024-04-10 LAB — S PYO AG THROAT QL IA.RAPID: NEGATIVE

## 2024-04-10 PROCEDURE — 99212 OFFICE O/P EST SF 10 MIN: CPT

## 2024-04-10 PROCEDURE — 87651 STREP A DNA AMP PROBE: CPT | Performed by: NURSE PRACTITIONER

## 2024-04-10 PROCEDURE — 99213 OFFICE O/P EST LOW 20 MIN: CPT

## 2024-04-10 NOTE — DISCHARGE INSTRUCTIONS
Salt water gargles.  Push fluids.  Tylenol or Motrin as needed for pain or fever.  Follow-up as needed with your doctor.  Return for any concerns.

## 2024-04-10 NOTE — ED PROVIDER NOTES
Patient Seen in: [facility]      History     Chief Complaint   Patient presents with    Sore Throat     Stated Complaint: sore throat  Subjective:   49-year-old male presents for a strep test.  He states multiple people in his family have strep throat and he has had a scratchy throat and is supposed to go out of town.  No difficulty swallowing.  Speech is clear.  No difficulty breathing or stridor.  No cough or congestion.  No fevers or chills.  No neck stiffness.  No rashes.  No headaches.  No dizziness.  He is eating and drinking normally.  He appears nontoxic.      Objective:   Past Medical History:    Gout    Gout            History reviewed. No pertinent surgical history.           No pertinent social history.          Review of Systems    Positive for stated complaint: sore throat  Other systems are as noted in HPI.  Constitutional and vital signs reviewed.      All other systems reviewed and negative except as noted above.    Physical Exam     ED Triage Vitals [04/10/24 0906]   /90   Pulse 74   Resp 18   Temp 97.3 °F (36.3 °C)   Temp src Temporal   SpO2 97 %   O2 Device None (Room air)     Current:/90   Pulse 74   Temp 97.3 °F (36.3 °C) (Temporal)   Resp 18   SpO2 97%     Physical Exam  Vitals and nursing note reviewed.   Constitutional:       General: He is not in acute distress.     Appearance: He is well-developed. He is not toxic-appearing.   HENT:      Head: Normocephalic.      Right Ear: Tympanic membrane normal.      Left Ear: Tympanic membrane normal.      Nose: No congestion or rhinorrhea.      Mouth/Throat:      Mouth: Mucous membranes are dry. No oral lesions.      Pharynx: Oropharynx is clear. Posterior oropharyngeal erythema present. No pharyngeal swelling, oropharyngeal exudate or uvula swelling.      Tonsils: No tonsillar exudate or tonsillar abscesses.   Cardiovascular:      Rate and Rhythm: Normal rate and regular rhythm.   Pulmonary:      Effort: Pulmonary  effort is normal.      Breath sounds: Normal breath sounds. No wheezing, rhonchi or rales.   Chest:      Chest wall: No tenderness.   Musculoskeletal:      Cervical back: Normal range of motion and neck supple.   Lymphadenopathy:      Cervical: No cervical adenopathy.   Skin:     General: Skin is warm and dry.      Capillary Refill: Capillary refill takes less than 2 seconds.      Findings: No rash.   Neurological:      General: No focal deficit present.      Mental Status: He is alert and oriented to person, place, and time.   Psychiatric:         Mood and Affect: Mood normal.         Behavior: Behavior normal.         ED Course   No results found.  Labs Reviewed   RAPID STREP A - Normal       [facility]       Medical Decision Making  The strep test is negative.  His symptoms are most likely viral.  We discussed furtive care including salt water gargles, rest, Tylenol or Motrin as needed for pain or fever, pushing fluids, and rest.  He will follow-up as needed with his primary care doctor if her symptoms persist.    Amount and/or Complexity of Data Reviewed  Labs: ordered.     Details: Strep negative    Risk  OTC drugs.  Risk Details: Viral pharyngitis versus strep throat.        Disposition and Plan     Clinical Impression:  1. Viral pharyngitis         Disposition:  Discharge  4/10/2024  9:26 am    Follow-up:  Eugenie Gregorio MD  [address] [place] [id number] [phone]    Schedule an appointment as soon as possible for a visit   As needed          Medications Prescribed:  Current Discharge Medication List

## 2024-04-10 NOTE — ED INITIAL ASSESSMENT (HPI)
Patient states wife and son recently dx with strep throat.  Reports sore throat over the last 2-3 days.  Denies fevers.

## 2024-09-16 NOTE — TELEPHONE ENCOUNTER
Requested Prescriptions     Pending Prescriptions Disp Refills    ALLOPURINOL 300 MG Oral Tab [Pharmacy Med Name: ALLOPURINOL 300MG TABLETS] 90 tablet 3     Sig: TAKE 1 TABLET(300 MG) BY MOUTH DAILY       No future appointments.    LOV: 9/25/23   Last Refilled:9/25/2023 #90 3RF       Summary:  1. Cont. indocine 50mg a day tid prn as needed.   2. Increase allopurinol to 300mg a day and take indocin 50mg a day x 2 -4 weeks, then stop   3. Check labs    4. Return to clinic in 6 months.         Sandra Kamara MD  9/25/2023   4:19 PM  - [place]  through [employer]

## 2024-09-17 RX ORDER — ALLOPURINOL 300 MG/1
300 TABLET ORAL DAILY
Qty: 90 TABLET | Refills: 0 | Status: SHIPPED | OUTPATIENT
Start: 2024-09-17

## 2024-12-14 DIAGNOSIS — M1A.9XX0 CHRONIC GOUT WITHOUT TOPHUS, UNSPECIFIED CAUSE, UNSPECIFIED SITE: Primary | ICD-10-CM

## 2024-12-14 NOTE — TELEPHONE ENCOUNTER
LOV: 9/25/23  No future appointments.  Labs: Uric Acid 5.7 9/25/23    Summary:  1. Cont. indocine 50mg a day tid prn as needed.   2. Increase allopurinol to 300mg a day and take indocin 50mg a day x 2 -4 weeks, then stop   3. Check labs    4. Return to clinic in 6 months.         Sandra Kamara MD  9/25/2023   4:19 PM

## 2024-12-17 RX ORDER — ALLOPURINOL 300 MG/1
300 TABLET ORAL DAILY
Qty: 90 TABLET | Refills: 0 | OUTPATIENT
Start: 2024-12-17

## 2024-12-17 NOTE — TELEPHONE ENCOUNTER
Pt. Needs appt. And then can refill til his appt. - needs labs as well - and put orders in - after he gets labs this week - can fill his script til his next appt.

## 2024-12-30 NOTE — TELEPHONE ENCOUNTER
LOV: 9/25/23  Future Appointments   Date Time Provider Department Center   12/31/2024 10:20 AM Danilo Kim MD [facility]     Labs: Uric Acid 5.7 9/25/23  Summary:  1. Cont. indocine 50mg a day tid prn as needed.   2. Increase allopurinol to 300mg a day and take indocin 50mg a day x 2 -4 weeks, then stop   3. Check labs    4. Return to clinic in 6 months.         Sandra Kamara MD  9/25/2023   4:19 PM

## 2024-12-31 ENCOUNTER — OFFICE VISIT (OUTPATIENT)
Dept: FAMILY MEDICINE CLINIC | Facility: CLINIC | Age: 49
End: 2024-12-31
Payer: MEDICAID

## 2024-12-31 VITALS
HEART RATE: 85 BPM | RESPIRATION RATE: 18 BRPM | WEIGHT: 207 LBS | DIASTOLIC BLOOD PRESSURE: 72 MMHG | HEIGHT: 70 IN | SYSTOLIC BLOOD PRESSURE: 111 MMHG | BODY MASS INDEX: 29.63 KG/M2

## 2024-12-31 DIAGNOSIS — Z87.39 HISTORY OF GOUT: Primary | ICD-10-CM

## 2024-12-31 PROCEDURE — 99213 OFFICE O/P EST LOW 20 MIN: CPT | Performed by: FAMILY MEDICINE

## 2024-12-31 RX ORDER — ALLOPURINOL 300 MG/1
300 TABLET ORAL DAILY
Qty: 90 TABLET | Refills: 1 | Status: SHIPPED | OUTPATIENT
Start: 2024-12-31

## 2024-12-31 RX ORDER — AMOXICILLIN 500 MG/1
500 TABLET, FILM COATED ORAL 3 TIMES DAILY
COMMUNITY
Start: 2024-12-20 | End: 2024-12-31 | Stop reason: ALTCHOICE

## 2024-12-31 RX ORDER — INDOMETHACIN 50 MG/1
50 CAPSULE ORAL 3 TIMES DAILY PRN
Qty: 30 CAPSULE | Refills: 0 | Status: SHIPPED | OUTPATIENT
Start: 2024-12-31

## 2024-12-31 NOTE — PROGRESS NOTES
Subjective:   Patient ID: Tito Iniguez is a 49 year old male.    Patient is here for follow up for chronic medical issues- gout. The patient is compliant with medications and no side effects. There are no acute issues and patient is requesting refills on his medications as he has not been able to see his rheumatologist. The patient states medications have been helpful and effective. Has had no gout flare ups since starting allopurinol. Also requesting refill for indomethacin just in case if he gets a flare up.  Eating and taking fluids well.         History/Other:   Review of Systems   Constitutional:  Negative for fever.   Musculoskeletal:  Negative for arthralgias, joint swelling and myalgias.     Current Outpatient Medications   Medication Sig Dispense Refill    allopurinol 300 MG Oral Tab Take 1 tablet (300 mg total) by mouth daily. 90 tablet 1    indomethacin 50 MG Oral Cap Take 1 capsule (50 mg total) by mouth 3 (three) times daily as needed. 30 capsule 0     Allergies:Allergies[1]    Objective:   Physical Exam  Constitutional:       Appearance: Normal appearance.   Neurological:      Mental Status: He is alert.         Assessment & Plan:   1. History of gout: no recent flares and doing well:  - Stable: medications reviewed and renewed; To continue present treatment; To call if problems; Routine follow up in 6-12 months. Can follow up with Dr Kamara as planned.          No orders of the defined types were placed in this encounter.      Meds This Visit:  Requested Prescriptions     Signed Prescriptions Disp Refills    allopurinol 300 MG Oral Tab 90 tablet 1     Sig: Take 1 tablet (300 mg total) by mouth daily.    indomethacin 50 MG Oral Cap 30 capsule 0     Sig: Take 1 capsule (50 mg total) by mouth 3 (three) times daily as needed.       Imaging & Referrals:  None         [1] No Known Allergies

## 2025-01-06 RX ORDER — INDOMETHACIN 50 MG/1
50 CAPSULE ORAL 3 TIMES DAILY PRN
Qty: 30 CAPSULE | Refills: 1 | OUTPATIENT
Start: 2025-01-06

## 2025-06-27 RX ORDER — ALLOPURINOL 300 MG/1
300 TABLET ORAL DAILY
Qty: 90 TABLET | Refills: 1 | Status: SHIPPED | OUTPATIENT
Start: 2025-06-27

## 2025-06-28 NOTE — TELEPHONE ENCOUNTER
Message noted: Chart reviewed and may refill medication as requested. Prescription sent to listed pharmacy. Pharmacy to notify patient. Pt notified through [facility]

## (undated) NOTE — ED AVS SNAPSHOT
Bridget Rubinstein   MRN: [id number]    Department:  [facility] Emergency Department   Date of Visit:  7/29/2017           Disclosure     Insurance plans vary and the physician(s) referred by the ER may not be covered by your plan.  Please contact your CARE PHYSICIAN AT ONCE OR RETURN IMMEDIATELY TO THE EMERGENCY DEPARTMENT. If you have been prescribed any medication(s), please fill your prescription right away and begin taking the medication(s) as directed.   If you believe that any of the medications

## (undated) NOTE — LETTER
04/06/21    Yana Boone Smrz  [id number]      Dear Kayla Ford,    [address] [facility] records indicate that you have outstanding lab work and or testing that was ordered for you and has not yet been completed:  Orders Placed This Encounter      Uric Ac